# Patient Record
Sex: MALE | URBAN - METROPOLITAN AREA
[De-identification: names, ages, dates, MRNs, and addresses within clinical notes are randomized per-mention and may not be internally consistent; named-entity substitution may affect disease eponyms.]

---

## 2023-03-30 ENCOUNTER — APPOINTMENT (OUTPATIENT)
Dept: URBAN - METROPOLITAN AREA CLINIC 226 | Age: 23
Setting detail: DERMATOLOGY
End: 2023-03-30

## 2023-03-30 DIAGNOSIS — L29.8 OTHER PRURITUS: ICD-10-CM

## 2023-03-30 DIAGNOSIS — L30.9 DERMATITIS, UNSPECIFIED: ICD-10-CM

## 2023-03-30 PROCEDURE — OTHER PRESCRIPTION: OTHER

## 2023-03-30 PROCEDURE — OTHER MEDICATION COUNSELING: OTHER

## 2023-03-30 PROCEDURE — OTHER COUNSELING: OTHER

## 2023-03-30 PROCEDURE — OTHER MIPS QUALITY: OTHER

## 2023-03-30 PROCEDURE — 99203 OFFICE O/P NEW LOW 30 MIN: CPT

## 2023-03-30 RX ORDER — TRIAMCINOLONE ACETONIDE 1 MG/G
CREAM TOPICAL BID
Qty: 80 | Refills: 1 | Status: ERX | COMMUNITY
Start: 2023-03-30

## 2023-03-30 NOTE — PROCEDURE: MEDICATION COUNSELING
Xelkathrynz Pregnancy And Lactation Text: This medication is Pregnancy Category D and is not considered safe during pregnancy.  The risk during breast feeding is also uncertain.

## 2023-03-30 NOTE — PROCEDURE: MIPS QUALITY
Quality 431: Preventive Care And Screening: Unhealthy Alcohol Use - Screening: Patient not screened for unhealthy alcohol use using a systematic screening method
Quality 110: Preventive Care And Screening: Influenza Immunization: Influenza Immunization not Administered for Documented Reasons.
Detail Level: Detailed
Quality 226: Preventive Care And Screening: Tobacco Use: Screening And Cessation Intervention: Patient screened for tobacco use and is an ex/non-smoker

## 2023-03-30 NOTE — PROCEDURE: MEDICATION COUNSELING
----- Message from Candance Harrier sent at 11/6/2017  9:51 AM EST -----  Regarding: /telephone  Pt is returning call to the practice. Best contact [phone]. Topical Retinoid counseling:  Patient advised to apply a pea-sized amount only at bedtime and wait 30 minutes after washing their face before applying.  If too drying, patient may add a non-comedogenic moisturizer. The patient verbalized understanding of the proper use and possible adverse effects of retinoids.  All of the patient's questions and concerns were addressed.

## 2023-04-20 ENCOUNTER — APPOINTMENT (OUTPATIENT)
Dept: URBAN - METROPOLITAN AREA CLINIC 226 | Age: 23
Setting detail: DERMATOLOGY
End: 2023-04-20

## 2023-04-20 DIAGNOSIS — L29.8 OTHER PRURITUS: ICD-10-CM

## 2023-04-20 DIAGNOSIS — L30.9 DERMATITIS, UNSPECIFIED: ICD-10-CM

## 2023-04-20 DIAGNOSIS — L81.6 OTHER DISORDERS OF DIMINISHED MELANIN FORMATION: ICD-10-CM

## 2023-04-20 PROCEDURE — OTHER COUNSELING: OTHER

## 2023-04-20 PROCEDURE — OTHER MIPS QUALITY: OTHER

## 2023-04-20 PROCEDURE — OTHER MEDICATION COUNSELING: OTHER

## 2023-04-20 PROCEDURE — OTHER TREATMENT REGIMEN: OTHER

## 2023-04-20 PROCEDURE — 99213 OFFICE O/P EST LOW 20 MIN: CPT

## 2023-04-20 ASSESSMENT — LOCATION SIMPLE DESCRIPTION DERM: LOCATION SIMPLE: RIGHT LIP

## 2023-04-20 ASSESSMENT — LOCATION ZONE DERM: LOCATION ZONE: LIP

## 2023-04-20 ASSESSMENT — LOCATION DETAILED DESCRIPTION DERM: LOCATION DETAILED: RIGHT INFERIOR VERMILION LIP

## 2023-06-28 NOTE — PROCEDURE: TREATMENT REGIMEN
Physical Therapy Evaluation    Patient Name:  Dorene Husain   MRN:  [id number]    Recommendations:     Discharge Recommendations: home    Discharge Equipment Recommendations: none    Barriers to discharge: None    Assessment:     Dorene Husain is a 28 y.o. female admitted with a medical diagnosis of Type 1 diabetes mellitus with ketoacidosis without coma.  She presents with the following impairments/functional limitations:   none on exam per PT    Rehab Prognosis: Good; patient would benefit from acute skilled PT services to address these deficits and reach maximum level of function.    Recent Surgery: * No surgery found *      Plan:     During this hospitalization, patient to be seen Eval Only due to PT not indicated at this time      Subjective     Chief Complaint: Pt agrees to participate in therapy  Patient/Family Comments/goals: to go home  Pain/Comfort:       Patients cultural, spiritual, Mu-ism conflicts given the current situation:      Living Environment:  Pt lives in  home  Prior to admission, patients level of function was I amb.  Equipment used at home:  .  DME owned (not currently used): none.  Upon discharge, patient will have assistance from family.    Objective:     Communicated with pt prior to session.  Patient found HOB elevated with    upon PT entry to room.    General Precautions: Standard,    Orthopedic Precautions:    Braces:    Respiratory Status: Room air    Exams:  RLE Strength: WFL  LLE Strength: WFL    Functional Mobility:  Bed Mobility:     Supine to Sit: independence  Transfers:     Sit to Stand:  independence with no AD  Gait: 300ft Indep no AD      AM-PAC 6 CLICK MOBILITY  Total Score:12       Treatment & Education:  No Indications for PT    Patient left HOB elevated with all lines intact.    GOALS:   Multidisciplinary Problems       Physical Therapy Goals          Problem: Physical Therapy    Goal Priority Disciplines Outcome Goal Variances Interventions   Physical Therapy Goal     
PT, PT/OT Ongoing, Progressing     Description: 1.  Pt will be able to perform Yobani bed mob  2.  Pt will be able to tf safely at least a SBA level with AAD  3.  Pt will be able to amb 150ft safely with AAD  4.  Pt will be I HEP to maintain strength during hospital stay                   All goals met today, no further PT indicated    History:     Past Medical History:   Diagnosis Date    Diabetes mellitus     Diabetic gastroparesis associated with type 1 diabetes mellitus     DKA (diabetic ketoacidosis)     Hypertension     Non compliance with medical treatment        Past Surgical History:   Procedure Laterality Date    CHOLECYSTECTOMY      ESOPHAGOGASTRODUODENOSCOPY      Multiple    None         Time Tracking:     PT Received On: 06/28/23  PT Start Time: 1500     PT Stop Time: 1530  PT Total Time (min): 30 min     Billable Minutes: Evaluation 30 and Therapeutic Activity 0      06/28/2023  
Detail Level: Zone
Plan: Recommend to stop Triamcinolone. Then if needed only use 2 weeks out of a month. Use moisturizers , warm baths, gentle products, and Zyrtec for itching.
Plan: Asymptomatic and not bothersome. Recommend to watch symptoms of possible vitiligo.  We monitor and follow up if there pigmentation increase on the lips or appears on others parts of the body.